# Patient Record
Sex: MALE
[De-identification: names, ages, dates, MRNs, and addresses within clinical notes are randomized per-mention and may not be internally consistent; named-entity substitution may affect disease eponyms.]

---

## 2022-07-23 ENCOUNTER — FORM ENCOUNTER (OUTPATIENT)
Age: 47
End: 2022-07-23

## 2022-07-24 ENCOUNTER — APPOINTMENT (OUTPATIENT)
Dept: MRI IMAGING | Facility: CLINIC | Age: 47
End: 2022-07-24

## 2022-07-24 ENCOUNTER — APPOINTMENT (OUTPATIENT)
Dept: ORTHOPEDIC SURGERY | Facility: CLINIC | Age: 47
End: 2022-07-24

## 2022-07-24 VITALS — WEIGHT: 200 LBS | HEIGHT: 71 IN | BODY MASS INDEX: 28 KG/M2

## 2022-07-24 DIAGNOSIS — Z78.9 OTHER SPECIFIED HEALTH STATUS: ICD-10-CM

## 2022-07-24 DIAGNOSIS — S60.211A CONTUSION OF RIGHT WRIST, INITIAL ENCOUNTER: ICD-10-CM

## 2022-07-24 PROBLEM — Z00.00 ENCOUNTER FOR PREVENTIVE HEALTH EXAMINATION: Status: ACTIVE | Noted: 2022-07-24

## 2022-07-24 PROCEDURE — 99072 ADDL SUPL MATRL&STAF TM PHE: CPT

## 2022-07-24 PROCEDURE — 73130 X-RAY EXAM OF HAND: CPT | Mod: RT

## 2022-07-24 PROCEDURE — 99204 OFFICE O/P NEW MOD 45 MIN: CPT | Mod: PA,25

## 2022-07-24 PROCEDURE — 73221 MRI JOINT UPR EXTREM W/O DYE: CPT | Mod: RT

## 2022-07-24 PROCEDURE — L3809: CPT

## 2022-07-24 PROCEDURE — 99072 ADDL SUPL MATRL&STAF TM PHE: CPT | Mod: ACP

## 2022-07-24 RX ORDER — HYDROCODONE BITARTRATE AND ACETAMINOPHEN 5; 325 MG/1; MG/1
5-325 TABLET ORAL 3 TIMES DAILY
Qty: 20 | Refills: 0 | Status: ACTIVE | COMMUNITY
Start: 2022-07-24 | End: 1900-01-01

## 2022-07-24 NOTE — HISTORY OF PRESENT ILLNESS
[10] : 10 [de-identified] : WC 7/21/22\par \par 07/24/2022: 47 year old RHD male here for eval of Right wrist pain since 7/21. Pain began while lifting/ pushing/ pulling heavy weight at work. \par works manual labor \par Denies h/o of wrist problems [FreeTextEntry5] : pt injured rt wrist on thursday states he worked after \par pt c.o pain in rt hand and wrist

## 2022-07-24 NOTE — PHYSICAL EXAM
[Right] : right hand [Dorsal Wrist] : dorsal wrist [Anatomic Snuff Box] : anatomic snuff box [de-identified] : diffuse dorsal swelling [] : no laceration/abrasion [de-identified] : Not assessed

## 2022-07-24 NOTE — IMAGING
[Right] : right wrist [No acute displaced fracture or dislocation] : No acute displaced fracture or dislocation

## 2022-07-24 NOTE — ASSESSMENT
[FreeTextEntry1] : MRI - eval for scaphoid fracture\par Thumb spica splint\par RICE/ Vicodin\par FU w/ Hand

## 2022-07-28 ENCOUNTER — APPOINTMENT (OUTPATIENT)
Dept: ORTHOPEDIC SURGERY | Facility: CLINIC | Age: 47
End: 2022-07-28

## 2022-07-28 VITALS — HEIGHT: 71 IN | WEIGHT: 200 LBS | BODY MASS INDEX: 28 KG/M2

## 2022-07-28 PROCEDURE — 99214 OFFICE O/P EST MOD 30 MIN: CPT

## 2022-07-28 PROCEDURE — 99072 ADDL SUPL MATRL&STAF TM PHE: CPT

## 2022-07-28 NOTE — HISTORY OF PRESENT ILLNESS
[de-identified] : WC DOI: 7/21/22\par \par 7/28/22:  Pt was lifting at work and he started to have right wrist pain.  The pt has tried icing with no relief.  His left wrist feels better in brace.\par RHD\par \par MRI right wrist:\par 1. Multiple findings include marrow edema in the dorsum of the base of the second metacarpal and adjacent \par trapezoid potentially related to contusions with moderate soft tissue swelling and extensor tenosynovitis \par throughout the dorsal radial aspect of the wrist extending into the dorsum of the thumb.\par 2. Severe complex full-thickness TFCC body ligament tearing with moderate DRUJ effusion and synovitis with \par small subcortical cysts in the distal ulna and proximal ulnar aspect of the lunate centrally.\par 3. There is radiocarpal effusion and synovitis.\par 4. There is no acute scaphoid fracture.\par 5. There is no tendon discontinuity.\par 6. Clinical correlation and follow up is recommended.

## 2022-07-28 NOTE — WORK
[Sprain/Strain] : sprain/strain [Was the competent medical cause of the injury] : was the competent medical cause of the injury [Are consistent with the injury] : are consistent with the injury [Consistent with my objective findings] : consistent with my objective findings [Total] : total [Does not reveal pre-existing condition(s) that may affect treatment/prognosis] : does not reveal pre-existing condition(s) that may affect treatment/prognosis [Cannot return to work because ________] : cannot return to work because [unfilled] [15+ days] : 15+ days [Patient] : patient [No Rx restrictions] : No Rx restrictions. [I provided the services listed above] :  I provided the services listed above. [FreeTextEntry1] : Guarded

## 2022-07-28 NOTE — IMAGING
[de-identified] : Right wrist:\par Mild swelling\par TTP over dorsal scaphoid, snuffbox, volar scaphoid\par FAROM\par NVID

## 2022-07-28 NOTE — ASSESSMENT
[FreeTextEntry1] : The patient was advised of the diagnosis. The natural history of the pathology was explained in full to the patient in layman's terms. All questions were answered. The risks and benefits of surgical and non-surgical treatment alternatives were explained in full to the patient.\par Pt will continue to wear brace.\par F/u in 2 weeks.

## 2022-07-28 NOTE — DATA REVIEWED
[MRI] : MRI [Right] : of the right [Wrist] : wrist [I independently reviewed and interpreted images and report] : I independently reviewed and interpreted images and report [FreeTextEntry1] : 1. Multiple findings include marrow edema in the dorsum of the base of the second metacarpal and adjacent \par trapezoid potentially related to contusions with moderate soft tissue swelling and extensor tenosynovitis \par throughout the dorsal radial aspect of the wrist extending into the dorsum of the thumb.\par 2. Severe complex full-thickness TFCC body ligament tearing with moderate DRUJ effusion and synovitis with \par small subcortical cysts in the distal ulna and proximal ulnar aspect of the lunate centrally.\par 3. There is radiocarpal effusion and synovitis.\par 4. There is no acute scaphoid fracture.\par 5. There is no tendon discontinuity.\par 6. Clinical correlation and follow up is recommended.

## 2022-08-11 ENCOUNTER — APPOINTMENT (OUTPATIENT)
Dept: ORTHOPEDIC SURGERY | Facility: CLINIC | Age: 47
End: 2022-08-11

## 2022-08-11 VITALS — BODY MASS INDEX: 28 KG/M2 | WEIGHT: 200 LBS | HEIGHT: 71 IN

## 2022-08-11 DIAGNOSIS — S63.501A UNSPECIFIED SPRAIN OF RIGHT WRIST, INITIAL ENCOUNTER: ICD-10-CM

## 2022-08-11 PROCEDURE — 99214 OFFICE O/P EST MOD 30 MIN: CPT

## 2022-08-11 PROCEDURE — 99072 ADDL SUPL MATRL&STAF TM PHE: CPT

## 2022-08-11 NOTE — ASSESSMENT
[FreeTextEntry1] : The patient was advised of the diagnosis. The natural history of the pathology was explained in full to the patient in layman's terms. All questions were answered. The risks and benefits of surgical and non-surgical treatment alternatives were explained in full to the patient.\par \par Pt no longer has to wear brace.

## 2022-08-11 NOTE — HISTORY OF PRESENT ILLNESS
[de-identified] : WC DOI: 7/21/22\par \par 8/11/22:  Pt has improved right wrist pain\par \par 7/28/22:  Pt was lifting at work and he started to have right wrist pain.  The pt has tried icing with no relief.  His left wrist feels better in brace.\par RHD\par \par MRI right wrist:\par 1. Multiple findings include marrow edema in the dorsum of the base of the second metacarpal and adjacent \par trapezoid potentially related to contusions with moderate soft tissue swelling and extensor tenosynovitis \par throughout the dorsal radial aspect of the wrist extending into the dorsum of the thumb.\par 2. Severe complex full-thickness TFCC body ligament tearing with moderate DRUJ effusion and synovitis with \par small subcortical cysts in the distal ulna and proximal ulnar aspect of the lunate centrally.\par 3. There is radiocarpal effusion and synovitis.\par 4. There is no acute scaphoid fracture.\par 5. There is no tendon discontinuity.\par 6. Clinical correlation and follow up is recommended.

## 2022-08-11 NOTE — WORK
[Sprain/Strain] : sprain/strain [Was the competent medical cause of the injury] : was the competent medical cause of the injury [Are consistent with the injury] : are consistent with the injury [Consistent with my objective findings] : consistent with my objective findings [Does not reveal pre-existing condition(s) that may affect treatment/prognosis] : does not reveal pre-existing condition(s) that may affect treatment/prognosis [Patient] : patient [No Rx restrictions] : No Rx restrictions. [I provided the services listed above] :  I provided the services listed above. [Partial] : partial [Can return to work without limitations on ______] : can return to work without limitations on [unfilled] [3-7 days] : 3-7 days [FreeTextEntry1] : Guarded

## 2022-08-11 NOTE — IMAGING
[de-identified] : Right wrist:\par No swelling\par No TTP over dorsal scaphoid, snuffbox, volar scaphoid\par FAROM\par NVID

## 2022-09-20 ENCOUNTER — APPOINTMENT (OUTPATIENT)
Dept: ORTHOPEDIC SURGERY | Facility: CLINIC | Age: 47
End: 2022-09-20

## 2023-12-08 ENCOUNTER — APPOINTMENT (OUTPATIENT)
Dept: CT IMAGING | Facility: CLINIC | Age: 48
End: 2023-12-08

## 2023-12-26 ENCOUNTER — APPOINTMENT (OUTPATIENT)
Dept: CT IMAGING | Facility: CLINIC | Age: 48
End: 2023-12-26